# Patient Record
Sex: FEMALE | URBAN - METROPOLITAN AREA
[De-identification: names, ages, dates, MRNs, and addresses within clinical notes are randomized per-mention and may not be internally consistent; named-entity substitution may affect disease eponyms.]

---

## 2018-05-08 ENCOUNTER — TRANSFERRED RECORDS (OUTPATIENT)
Dept: HEALTH INFORMATION MANAGEMENT | Facility: CLINIC | Age: 63
End: 2018-05-08

## 2021-06-08 ENCOUNTER — TRANSFERRED RECORDS (OUTPATIENT)
Dept: HEALTH INFORMATION MANAGEMENT | Facility: CLINIC | Age: 66
End: 2021-06-08

## 2021-06-24 ENCOUNTER — TRANSFERRED RECORDS (OUTPATIENT)
Dept: HEALTH INFORMATION MANAGEMENT | Facility: CLINIC | Age: 66
End: 2021-06-24

## 2021-06-28 ENCOUNTER — TRANSCRIBE ORDERS (OUTPATIENT)
Dept: OTHER | Age: 66
End: 2021-06-28

## 2021-06-28 DIAGNOSIS — H53.40 VISUAL FIELD DEFECT: Primary | ICD-10-CM

## 2021-08-03 ENCOUNTER — OFFICE VISIT (OUTPATIENT)
Dept: OPHTHALMOLOGY | Facility: CLINIC | Age: 66
End: 2021-08-03
Attending: OPTOMETRIST
Payer: COMMERCIAL

## 2021-08-03 DIAGNOSIS — H53.40 VISUAL FIELD DEFECT: ICD-10-CM

## 2021-08-03 DIAGNOSIS — H40.1132 PRIMARY OPEN ANGLE GLAUCOMA (POAG) OF BOTH EYES, MODERATE STAGE: Primary | ICD-10-CM

## 2021-08-03 DIAGNOSIS — H53.40 VISUAL FIELD DEFECT: Primary | ICD-10-CM

## 2021-08-03 PROCEDURE — 92133 CPTRZD OPH DX IMG PST SGM ON: CPT | Performed by: OPHTHALMOLOGY

## 2021-08-03 PROCEDURE — 99204 OFFICE O/P NEW MOD 45 MIN: CPT | Performed by: OPHTHALMOLOGY

## 2021-08-03 PROCEDURE — 92083 EXTENDED VISUAL FIELD XM: CPT | Performed by: OPHTHALMOLOGY

## 2021-08-03 PROCEDURE — G0463 HOSPITAL OUTPT CLINIC VISIT: HCPCS

## 2021-08-03 RX ORDER — LATANOPROST 50 UG/ML
SOLUTION/ DROPS OPHTHALMIC
Qty: 7.5 ML | OUTPATIENT
Start: 2021-08-03

## 2021-08-03 RX ORDER — LATANOPROST 50 UG/ML
1 SOLUTION/ DROPS OPHTHALMIC AT BEDTIME
Qty: 2.5 ML | Refills: 1 | Status: SHIPPED | OUTPATIENT
Start: 2021-08-03

## 2021-08-03 ASSESSMENT — VISUAL ACUITY
CORRECTION_TYPE: GLASSES
OD_CC+: -2
OD_CC: 20/20
METHOD: SNELLEN - LINEAR
OS_CC: 20/20

## 2021-08-03 ASSESSMENT — TONOMETRY
IOP_METHOD: ICARE
OS_IOP_MMHG: 18
OD_IOP_MMHG: 17

## 2021-08-03 ASSESSMENT — CUP TO DISC RATIO
OS_RATIO: 0.85
OD_RATIO: 0.9

## 2021-08-03 ASSESSMENT — REFRACTION_WEARINGRX
OS_ADD: +2.50
OS_SPHERE: -4.75
OD_SPHERE: -5.75
OD_AXIS: 177
OS_CYLINDER: +1.25
OD_CYLINDER: +0.75
OS_AXIS: 032
OD_ADD: +2.50

## 2021-08-03 ASSESSMENT — SLIT LAMP EXAM - LIDS
COMMENTS: NORMAL
COMMENTS: NORMAL

## 2021-08-03 ASSESSMENT — EXTERNAL EXAM - LEFT EYE: OS_EXAM: NORMAL

## 2021-08-03 ASSESSMENT — CONF VISUAL FIELD
OS_NORMAL: 1
OD_NORMAL: 1

## 2021-08-03 ASSESSMENT — EXTERNAL EXAM - RIGHT EYE: OD_EXAM: NORMAL

## 2021-08-03 NOTE — NURSING NOTE
Chief Complaints and History of Present Illnesses   Patient presents with     Blurred Vision Evaluation     Chief Complaint(s) and History of Present Illness(es)     Blurred Vision Evaluation               Comments     Amber Bronson is a 66 year old female who presents today for    1. VISUAL FIELD DEFECT  Referring provider was concerned about glaucoma vs. Optic neuropathy    Ab PATRICK 1:44 PM August 3, 2021

## 2021-08-03 NOTE — LETTER
8/3/2021         RE:  :  MRN: Amber Bronson  1955  [id number]     Dear Dr. Zhanna Sharif,    Thank you for asking me to see your very pleasant patient, Amber Bronson, in neuro-ophthalmic consultation.  I would like to thank you for sending your records and I have summarized them in the history of present illness.  My assessment and plan are below.  For further details, please see my attached clinic note.      Assessment & Plan     Amber Bronson is a 66 year old female with the following diagnoses:   No diagnosis found.     Patient was sent for consultation by Dr. Zhanna Sharif for optic neuropathy due to glaucoma versus other etiology.     HPI:  Ms. Bronson is a 66 year old who is a glaucoma suspect who is referred by Dr. Sharif for recommendations of glaucoma vs. Optic neuropathy in the setting of congruent inferotemporal quandranopsia . She has not noticed any visual symptoms. She is somewhat concerned with night driving as oncoming headlights make it hard to see and she takes a while to adjust.     She also has recently noticed lower leg restlessness at night and enjoys 2-3 glasses of red wine.     No unexpected weight loss,       Independent historians:  Patient    Review of outside testing:  VF and OCT 2021    My interpretation performed today of outside testing:        Review of outside clinical notes:  Dr. Sharif note 21  Would like her congruent inferotemporal quandranopsia assessed.    Past medical history:  Low back pain    Medications:   No medications    Family history / social history:  Glaucoma in mother, sister, brother  Mother with bladder cancer  Sister breast cancer  2 paternal cousins with breast cancer  2-3 glasses of red wine daily    Exam:  VA 20/20 each eye, IOP 17 right eye 18 left eye, Color 11/11 each eye, EOM full, no RAPD  Fundus exam  -Disc:cup 0.9 each eye     Tests ordered and interpreted today:  OCT  - right eye superior and inferior RNFL thinning  - left  eye superior RNFL thinning    VF   - right eye peripheral constriction  - left eye infero-nasal scotoma    Discussion of management / interpretation with another provider:       Assessment/Plan:   It is my impression that Ms. Bronson likely does not have an optic neuropahty given the pattern of vision loss, no RAPD, no dyschromatopsia, and appearance of the optic cup and disc. She most likely has glaucoma and that is the cause for her visual field loss.     One eye trial of latanoprost and recheck IOP with Dr. Sharif in 6-8 weeks.           Again, thank you for allowing me to participate in the care of your patient.      Sincerely,    Armaan Logan MD  Professor  Ophthalmology Residency   Director of Neuro-Ophthalmology  [employer] [facility] [employer] Endowed Chair  Departments of Ophthalmology, Neurology, and Neurosurgery  [address]  [address]  T - [phone]  F - [phone]  [email or web address]      CC: Zhanna Sharif, OD  [facility]  [address] [place] [id number]  Via Fax: [phone]     Amber Bronson  [address] [place] [id number]  Via Mail     Physician No Ref-Primary  Via Fax: [phone]    DX = ***

## 2021-08-03 NOTE — PROGRESS NOTES
Assessment & Plan     Amber Bronson is a 66 year old female with the following diagnoses:   1. Primary open angle glaucoma (POAG) of both eyes, moderate stage    2. Visual field defect         Patient was sent for consultation by Dr. Zhanna Sharif for optic neuropathy due to glaucoma versus other etiology.     HPI:  Ms. Bronson is a 66 year old who is a glaucoma suspect who is referred by Dr. Sharif for recommendations of glaucoma vs. Optic neuropathy in the setting of congruent inferotemporal quandranopsia . She has not noticed any visual symptoms. She is somewhat concerned with night driving as oncoming headlights make it hard to see and she takes a while to adjust.     She also has recently noticed lower leg restlessness at night and enjoys 2-3 glasses of red wine.     No unexpected weight loss, or other neurologic symptoms.      Independent historians:  Patient    Review of outside testing:  VF and OCT 6/24/2021    My interpretation performed today of outside testing:  The visual fields from twenty twenty-one showed a possible inferotemporal visual field defect in the right eye.  The visual fields from twenty eighteen showed an arcuate defect.  I do not think that the visual fields recently showed a hemianopic defect.  The left eye did not show a visual field defect that respected the vertical meridian.      Review of outside clinical notes:  Dr. Sharif note 6/24/21  Would like her congruent inferotemporal quandranopsia assessed.    Past medical history:  Low back pain    Medications:   No medications    Family history / social history:  Glaucoma in mother, sister, brother  Mother with bladder cancer  Sister breast cancer  2 paternal cousins with breast cancer  2-3 glasses of red wine daily    Exam:  VA 20/20 each eye, IOP 17 right eye 18 left eye, Color 11/11 each eye, EOM full, no RAPD  Fundus exam  -Disc:cup 0.9 each eye     Tests ordered and interpreted today:  OCT  - right eye superior and inferior  RNFL thinning  - left eye superior RNFL thinning    VF   - right eye peripheral constriction  - left eye infero-nasal scotoma    Discussion of management / interpretation with another provider:   None    Assessment/Plan:   It is my impression that Ms. Bronson most likely has glaucoma.  She has a strong family history of glaucoma.  She has significant cupping of both optic nerves.  She is asymptomatic.  She identifies all of the Ishihara color plates.  Her visual acuity is 20/20 in both eyes.  All of these are very consistent with the concept of glaucoma.  I repeated her visual field tests today and this showed an arcuate defect in both eyes.  OCT showed thinning of the superior and inferior nerve fiber layer in the right eye and the superior nerve fiber layer in the left eye.  Again, these are consistent with glaucoma.  These visual field defects do not correspond to an intracranial cause.  I think that she took a poor visual field test at her last visit.    I will give her a one eye trial of latanoprost and recheck IOP with Dr. Sharif in 6-8 weeks.             Attending Physician Attestation:  Complete documentation of historical and exam elements from today's encounter can be found in the full encounter summary report (not reduplicated in this progress note).  I personally obtained the chief complaint(s) and history of present illness.  I confirmed and edited as necessary the review of systems, past medical/surgical history, family history, social history, and examination findings as documented by others; and I examined the patient myself.  I personally reviewed the relevant tests, images, and reports as documented above.  I formulated and edited as necessary the assessment and plan and discussed the findings and management plan with the patient and family. I personally reviewed the ophthalmic test(s) associated with this encounter, agree with the interpretation(s) as documented by the resident/fellow, and have  edited the corresponding report(s) as necessary.  - Armaan Danielson, MS4

## 2021-08-03 NOTE — Clinical Note
8/3/2021       RE: Amber Bronson  [address] [address]     Dear Colleague,    Thank you for referring your patient, Amber Bronson, to the [facility] at [facility]. Please see a copy of my visit note below.         Assessment & Plan     Amber Bronson is a 66 year old female with the following diagnoses:   No diagnosis found.     Patient was sent for consultation by Dr. Zhanna Sharif for optic neuropathy due to glaucoma versus other etiology.     HPI:  Ms. Bronson is a 66 year old who is a glaucoma suspect who is referred by Dr. Sharif for recommendations of glaucoma vs. Optic neuropathy in the setting of congruent inferotemporal quandranopsia . She has not noticed any visual symptoms. She is somewhat concerned with night driving as oncoming headlights make it hard to see and she takes a while to adjust.     She also has recently noticed lower leg restlessness at night and enjoys 2-3 glasses of red wine.     No unexpected weight loss,       Independent historians:  Patient    Review of outside testing:  VF and OCT 6/24/2021    My interpretation performed today of outside testing:        Review of outside clinical notes:  Dr. Sharif note 6/24/21  Would like her congruent inferotemporal quandranopsia assessed.    Past medical history:  Low back pain    Medications:   No medications    Family history / social history:  Glaucoma in mother, sister, brother  Mother with bladder cancer  Sister breast cancer  2 paternal cousins with breast cancer  2-3 glasses of red wine daily    Exam:  VA 20/20 each eye, IOP 17 right eye 18 left eye, Color 11/11 each eye, EOM full, no RAPD  Fundus exam  -Disc:cup 0.9 each eye     Tests ordered and interpreted today:  OCT  - right eye superior and inferior RNFL thinning  - left eye superior RNFL thinning    VF   - right eye peripheral constriction  - left eye infero-nasal scotoma    Discussion of management  / interpretation with another provider:       Assessment/Plan:   It is my impression that Ms. Bronson likely does not have an optic neuropahty given the pattern of vision loss, no RAPD, no dyschromatopsia, and appearance of the optic cup and disc. She most likely has glaucoma and that is the cause for her visual field loss.     One eye trial of latanoprost and recheck in 6-8 weeks.             Attending Physician Attestation:  Complete documentation of historical and exam elements from today's encounter can be found in the full encounter summary report (not reduplicated in this progress note).  I personally obtained the chief complaint(s) and history of present illness.  I confirmed and edited as necessary the review of systems, past medical/surgical history, family history, social history, and examination findings as documented by others; and I examined the patient myself.  I personally reviewed the relevant tests, images, and reports as documented above.  I formulated and edited as necessary the assessment and plan and discussed the findings and management plan with the patient and family. I personally reviewed the ophthalmic test(s) associated with this encounter, agree with the interpretation(s) as documented by the resident/fellow, and have edited the corresponding report(s) as necessary.  - Armaan Danielson, MS4      Again, thank you for allowing me to participate in the care of your patient.      Sincerely,    Armaan Logan MD
